# Patient Record
Sex: MALE | Race: WHITE
[De-identification: names, ages, dates, MRNs, and addresses within clinical notes are randomized per-mention and may not be internally consistent; named-entity substitution may affect disease eponyms.]

---

## 2020-12-22 ENCOUNTER — HOSPITAL ENCOUNTER (EMERGENCY)
Dept: HOSPITAL 39 - ER | Age: 55
Discharge: NURSING, REHAB OR LONG-TERM CARE | End: 2020-12-22
Payer: SELF-PAY

## 2020-12-22 VITALS — OXYGEN SATURATION: 95 % | TEMPERATURE: 97 F | DIASTOLIC BLOOD PRESSURE: 84 MMHG | SYSTOLIC BLOOD PRESSURE: 125 MMHG

## 2020-12-22 DIAGNOSIS — Z88.6: ICD-10-CM

## 2020-12-22 DIAGNOSIS — Z88.0: ICD-10-CM

## 2020-12-22 DIAGNOSIS — K59.01: Primary | ICD-10-CM

## 2020-12-22 DIAGNOSIS — E07.9: ICD-10-CM

## 2020-12-22 DIAGNOSIS — K21.9: ICD-10-CM

## 2020-12-22 DIAGNOSIS — I10: ICD-10-CM

## 2020-12-22 DIAGNOSIS — Z79.899: ICD-10-CM

## 2020-12-22 PROCEDURE — 74019 RADEX ABDOMEN 2 VIEWS: CPT

## 2020-12-22 NOTE — ED.PDOC
History of Present Illness





- General


Chief Complaint: Abdominal Pain


Stated Complaint: abd pain, and constipation


Time Seen by Provider: 12/22/20 21:00


Source: patient


Exam Limitations: no limitations





- History of Present Illness


Initial Comments: 





The patient is a 55-year-old  male presented emergency room secondary 

to abdominal pain due to constipation.  The patient has had significant 

constipation episodes since his injury.  The patient has been having cramping 

for the last 24 to 48 hours.  He is passing some watery stool.  Rectal exam here

shows some hard stool in the rectal vault which is removed but still some more 

extensive stool proximal that cannot be reached.  No rebound or peritoneal 

signs.  No blood in the stool.  The only constipation medication I see on the 

patient's medication list is Colace.  No fever.  No vomiting.  Again he is 

having watery output around the stool.


Timing/Duration: 24 hours


Severity: moderate


Improving Factors: nothing


Worsening Factors: nothing


Associated Symptoms: denies symptoms


Allergies/Adverse Reactions: 


Allergies





Aspirin Allergy (Verified 12/22/20 21:34)


   


Penicillins Allergy (Verified 12/22/20 21:34)


   





Home Medications: 


Ambulatory Orders





Azathioprine [Azasan] 75 mg PO DAILY 12/22/20 


Benzocaine-Menthol (Mouth-Thro [Chloraseptic 6-10 mg] 1 ingrid MT Q4HR PRN 12/22/20




Bisacodyl Suppository 10Mg [Dulcolax Suppository 10mg] 10 mg AK DAILY PRN 

12/22/20 


Diphenhydramine-Zinc Acetate [Benadryl 1-0.1 %] 1 cre EX BID 12/22/20 


Docusate Sodium [Colace Cap] 100 mg PO DAILY 12/22/20 


Ibuprofen 400 mg PO Q8HR PRN 12/22/20 


Lactulose 20 gm PO Q6HR PRN #300 ml 12/22/20 


Melatonin 5 mg PO 12/22/20 


Multiple Vitamins W/ Minerals [Multivitamin] 1 tab PO 12/22/20 


Oxybutynin Chloride [Oxybutynin Chloride ER] 5 mg PO 12/22/20 


Promethazine HCl 25 mg PO TID PRN 12/22/20 


Pyridostigmine Bromide 60 mg PO TID PRN 12/22/20 











Review of Systems





- Review of Systems


Constitutional: States: no symptoms reported


EENTM: States: no symptoms reported


Respiratory: States: no symptoms reported


Cardiology: States: no symptoms reported


Gastrointestinal/Abdominal: States: abdominal pain, constipation


Genitourinary: States: no symptoms reported


Musculoskeletal: States: no symptoms reported


Skin: States: no symptoms reported


Neurological: States: anxiety


Endocrine: States: no symptoms reported


All other Systems: No Change from Baseline





Past Medical History (General)





- Patient Medical History


Hx Seizures: No


Hx Stroke: No


Hx Dementia: No


Hx Asthma: No


Hx of COPD: No


Hx Cardiac Disorders: No


Hx Congestive Heart Failure: No


Hx Pacemaker: No


Hx Hypertension: Yes


Hx Thyroid Disease: Yes


Hx Diabetes: No


Hx Gastroesophageal Reflux: Yes


Hx Renal Disease: Yes


Hx Cancer: No


Hx of HIV: No


Hx Hepatitis C: No


Hx MRSA: No





- Vaccination History


Hx Tetanus, Diphtheria Vaccination: Yes


Hx Influenza Vaccination: Yes


Hx Pneumococcal Vaccination: No


Immunizations Up to Date: Yes





- Social History


Hx Tobacco Use: No


Years Tobacco Use: 30


Cigarettes Packs Per Day: 1


Hx Chewing Tobacco Use: No


Hx Alcohol Use: No


Hx Substance Use: No


Hx Substance Use Treatment: No


Hx Depression: No





- Activities of Daily Living


Nursing Home/Assisted Living (if applicable):: [facility] (if applicable):: None





Family Medical History





- Family History


  ** Mother


Family History: Unknown





Physical Exam





- Physical Exam


General Appearance: Alert, Obvious distress


Eye Exam: bilateral normal


Ears, Nose, Throat: hearing grossly normal, normal pharynx


Neck: non-tender


Respiratory: lungs clear, normal breath sounds, no respiratory distress, no 

accessory muscle use


Cardiovascular/Chest: normal peripheral pulses, no edema, other - Borderline 

tachycardia but the patient is obviously uncomfortable


Peripheral Pulses: radial,right: 2+, radial,left: 2+


Gastrointestinal/Abdominal: soft, other - Stool is palpable.  No true rebound or

peritoneal signs.


Rectal Exam: other - Viscous Lidocaine was used for the rectal exam and fecal 

disimpaction.


Extremity: no pedal edema, normal capillary refill


Neurologic: CNs II-XII nml as tested, alert, normal mood/affect - He is 

appropriately anxious, oriented x 3


Skin Exam: normal color


Comments: 





                               Vital Signs - 24 hr











  12/22/20





  20:54


 


Temperature 97.3 F L


 


Pulse Rate [ 112 H





Right Radial] 


 


Respiratory 20





Rate 


 


Blood Pressure 126/101





[Right Arm] 


 


O2 Sat by Pulse 94 L





Oximetry 














Progress





- Progress


Progress: 





12/22/20 22:12


The patient is a 55-year-old  male presented emergency room secondary 

to abdominal pain due to recurrent constipation.  Manual disimpaction was 

attempted by me with viscous lidocaine.  He subsequently had a fleets enema and 

was given oral lactulose.  The patient is going to be written for oral lactulose

to be used up to 3 times daily if needed for constipation.  He should at least 

receive 3 doses over the course of tomorrow in order to help relieve the 

constipation higher up.  The patient does likely need a longer term medication 

to use on a daily or semidaily basis to help prevent constipation in the future 

due to his other medications and decreased mobility.  Periodic doses of Castror 

oil or mineral oil may help.  3-5 times weekly doses of MiraLAX may also help.  

No evidence of any bowel obstruction at this point.  Patient will be discharged 

back to the long-term care facility to resume his rehabilitation.





margret [email or web address] [id number]





- Results/Orders


Results/Orders: 





X-ray of the abdomen shows patient.  He also likely has a stone in the bladder 

and the kidneys.





Departure





- Departure


Clinical Impression: 


 Constipation by delayed colonic transit





Disposition: Discharge to SNF


Condition: Fair


Departure Forms:  ED Discharge - Pt. Copy, Patient Portal Self Enrollment


Instructions:  DI for Abdominal Pain-Adult, Constipation, Adult (DC)


Diet: other - High-fiber diet


Activity: increase activity as tolerated


Referrals: 


Omega Blount MD [Primary Care Provider] - 1-2 Days


Prescriptions: 


Lactulose 20 gm PO Q6HR PRN #300 ml


 PRN Reason: Constipation


Home Medications: 


Ambulatory Orders





Azathioprine [Azasan] 75 mg PO DAILY 12/22/20 


Benzocaine-Menthol (Mouth-Thro [Chloraseptic 6-10 mg] 1 ingrid MT Q4HR PRN 12/22/20




Bisacodyl Suppository 10Mg [Dulcolax Suppository 10mg] 10 mg AK DAILY PRN 

12/22/20 


Diphenhydramine-Zinc Acetate [Benadryl 1-0.1 %] 1 cre EX BID 12/22/20 


Docusate Sodium [Colace Cap] 100 mg PO DAILY 12/22/20 


Ibuprofen 400 mg PO Q8HR PRN 12/22/20 


Lactulose 20 gm PO Q6HR PRN #300 ml 12/22/20 


Melatonin 5 mg PO 12/22/20 


Multiple Vitamins W/ Minerals [Multivitamin] 1 tab PO 12/22/20 


Oxybutynin Chloride [Oxybutynin Chloride ER] 5 mg PO 12/22/20 


Promethazine HCl 25 mg PO TID PRN 12/22/20 


Pyridostigmine Bromide 60 mg PO TID PRN 12/22/20 








Additional Instructions: 


The patient is a 55-year-old  male presented emergency room secondary 

to abdominal pain due to recurrent constipation.  Manual disimpaction was 

attempted by me with viscous lidocaine.  He subsequently had a fleets enema and 

was given oral lactulose.  The patient is going to be written for oral lactulose

to be used up to 3 times daily if needed for constipation.  He should at least 

receive 3 doses over the course of tomorrow in order to help relieve the 

constipation higher up.  The patient does likely need a longer term medication 

to use on a daily or semidaily basis to help prevent constipation in the future 

due to his other medications and decreased mobility.  Periodic doses of Castror 

oil or mineral oil may help.  3-5 times weekly doses of MiraLAX may also help.  

No evidence of any bowel obstruction at this point.  Patient will be discharged 

back to the long-term care facility to resume his rehabilitation.

## 2020-12-22 NOTE — RAD
EXAM DESCRIPTION: Abdomen Flat   Upright 12/22/2020 10:00 PM CST



CLINICAL HISTORY: 55 years, Male, abd pain, possible constipation



COMPARISON: None.



FINDINGS:



2 X-ray views of the of the abdomen (supine and erect) were

performed.  No prior films are available this time for

comparison.  The gas pattern is nondiagnostic.  No signs of ileus

or obstruction. No free air under the diaphragm is noted. Fecal

residue within the rectum could correspond to infection. There is

a radiodensity within the left renal fossa area lower pole

measuring 8.7 mm and a questionable calcification within the

medial aspect of the renal fossa measuring 4.5 mm possibility of

nephrolithiasis could be of consideration.. There is also noted

the presence of a radiodensity within the central aspect of the

pelvis measuring 13.6 mm, whether this correspond to phlebolith

and/or urinary bladder calculus could be of consideration. The

bone windows minimal degenerative changes/anterior spondylosis.  



IMPRESSION:



QUESTIONABLE LEFT NEPHROLITHIASIS AND/OR URINARY BLADDER

CALCULUS.

CONSTIPATION/FECAL IMPACTION.



Electronically signed by:  Rajinder Montgomery MD  12/22/2020 10:02 PM

[facility] Workstation: [id number]

## 2021-01-14 ENCOUNTER — HOSPITAL ENCOUNTER (INPATIENT)
Dept: HOSPITAL 39 - ER | Age: 56
LOS: 5 days | Discharge: NURSING, REHAB OR LONG-TERM CARE | DRG: 522 | End: 2021-01-19
Attending: NURSE PRACTITIONER | Admitting: NURSE PRACTITIONER
Payer: MEDICAID

## 2021-01-14 DIAGNOSIS — G82.20: ICD-10-CM

## 2021-01-14 DIAGNOSIS — I10: ICD-10-CM

## 2021-01-14 DIAGNOSIS — N39.0: ICD-10-CM

## 2021-01-14 DIAGNOSIS — G70.00: ICD-10-CM

## 2021-01-14 DIAGNOSIS — R33.9: ICD-10-CM

## 2021-01-14 DIAGNOSIS — M80.052A: Primary | ICD-10-CM

## 2021-01-14 DIAGNOSIS — K59.09: ICD-10-CM

## 2021-01-14 DIAGNOSIS — F17.210: ICD-10-CM

## 2021-01-14 RX ADMIN — SODIUM CHLORIDE, POTASSIUM CHLORIDE, SODIUM LACTATE AND CALCIUM CHLORIDE PRN MLS/HR: 600; 310; 30; 20 INJECTION, SOLUTION INTRAVENOUS at 17:16

## 2021-01-14 RX ADMIN — Medication SCH: at 21:31

## 2021-01-14 RX ADMIN — HYDROMORPHONE HYDROCHLORIDE PRN MG: 2 INJECTION, SOLUTION INTRAMUSCULAR; INTRAVENOUS; SUBCUTANEOUS at 17:15
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
## 2021-01-14 NOTE — RAD
EXAM DESCRIPTION: 



Chest,1 View



CLINICAL HISTORY: 

z01.818 Preop



COMPARISON: 

None Available.



TECHNIQUE: 

One view radiograph of the chest



FINDINGS: 

Postsurgical changes lower cervical spine.

Cardiac silhouette shows normal heart size.

Pulmonary vascularity is within normal limits.

Lungs show no confluent infiltrates.

No pleural effusion. No pneumothorax.

Osseous structures of chest show no acute displaced fracture.



IMPRESSION: 

No acute cardiopulmonary process.

 



Electronically signed by:  Rajinder Young MD  1/14/2021 4:17 PM [facility]

Workstation: [id number]

## 2021-01-14 NOTE — RAD
EXAM: Hip,Left 2 Views



INDICATION: 55 years Male, fall 



COMPARISON: None available



FINDINGS:



2 views of the left hip were performed. Osteopenia. Acute

subcapital fracture of the proximal left femur with

foreshortening. The femoral head remains well seated within the

acetabulum. No apparent destructive osseous lesion.



IMPRESSION:



Acute subcapital fracture of the left femur.



Electronically signed by:  Verenice Hernandez MD  1/14/2021 12:49 PM

[facility] Workstation: [id number]
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
## 2021-01-15 PROCEDURE — 0SRS0J9 REPLACEMENT OF LEFT HIP JOINT, FEMORAL SURFACE WITH SYNTHETIC SUBSTITUTE, CEMENTED, OPEN APPROACH: ICD-10-PCS | Performed by: ORTHOPAEDIC SURGERY

## 2021-01-15 RX ADMIN — HYDROMORPHONE HYDROCHLORIDE PRN MG: 2 INJECTION, SOLUTION INTRAMUSCULAR; INTRAVENOUS; SUBCUTANEOUS at 00:47

## 2021-01-15 RX ADMIN — PANTOPRAZOLE SODIUM SCH MG: 40 INJECTION, POWDER, FOR SOLUTION INTRAVENOUS at 05:48

## 2021-01-15 RX ADMIN — Medication SCH ML: at 20:41

## 2021-01-15 RX ADMIN — HYDROMORPHONE HYDROCHLORIDE PRN MG: 2 INJECTION, SOLUTION INTRAMUSCULAR; INTRAVENOUS; SUBCUTANEOUS at 08:24

## 2021-01-15 RX ADMIN — CEFTRIAXONE SCH MLS/HR: 1 INJECTION, POWDER, FOR SOLUTION INTRAMUSCULAR; INTRAVENOUS at 15:53

## 2021-01-15 RX ADMIN — DOCUSATE CALCIUM SCH: 240 CAPSULE, LIQUID FILLED ORAL at 20:24

## 2021-01-15 RX ADMIN — HYDROMORPHONE HYDROCHLORIDE PRN MG: 2 INJECTION, SOLUTION INTRAMUSCULAR; INTRAVENOUS; SUBCUTANEOUS at 21:06

## 2021-01-15 RX ADMIN — DOCUSATE SODIUM SCH: 100 CAPSULE, LIQUID FILLED ORAL at 09:19

## 2021-01-15 RX ADMIN — SODIUM CHLORIDE, POTASSIUM CHLORIDE, SODIUM LACTATE AND CALCIUM CHLORIDE PRN MLS/HR: 600; 310; 30; 20 INJECTION, SOLUTION INTRAVENOUS at 00:54

## 2021-01-15 RX ADMIN — Medication SCH ML: at 09:18

## 2021-01-15 RX ADMIN — ENOXAPARIN SODIUM SCH: 30 INJECTION, SOLUTION INTRAVENOUS; SUBCUTANEOUS at 23:18

## 2021-01-15 RX ADMIN — HYDROMORPHONE HYDROCHLORIDE PRN MG: 2 INJECTION, SOLUTION INTRAMUSCULAR; INTRAVENOUS; SUBCUTANEOUS at 04:20

## 2021-01-15 RX ADMIN — ENOXAPARIN SODIUM SCH MG: 30 INJECTION, SOLUTION INTRAVENOUS; SUBCUTANEOUS at 22:56

## 2021-01-15 RX ADMIN — VANCOMYCIN HYDROCHLORIDE SCH MLS/HR: 500 INJECTION, POWDER, LYOPHILIZED, FOR SOLUTION INTRAVENOUS at 20:23

## 2021-01-15 NOTE — RAD
EXAM DESCRIPTION: 



Hip,Left 2 Views



CLINICAL HISTORY: 



post-op



COMPARISON: 



January 14, 2021



IMPRESSION: 



2 views of the left hip including crosstable lateral shows

interval postsurgical changes from hip arthroplasty with cement

fixation of the femoral component. No complicating features are

seen. Soft tissue emphysema lateral to the hip is identified.

Osseous structures are diffusely osteopenic.



Electronically signed by:  Errol Ramirez MD  1/15/2021 3:51 PM [facility] [[email or web address]]

Workstation: [id number]

## 2021-01-15 NOTE — RAD
EXAM DESCRIPTION: Pelvis,2 or More Views



CLINICAL HISTORY: 55 years Male, post op



COMPARISON: Yesterday



Findings: One view(s)/radiograph(s)



Left hip arthroplasty. No hardware complication. Osteopenia.

Suboptimal profiling of the right femoral neck. No acute fracture

lucency is identified. No dislocation.



IMPRESSION:

Left hip arthroplasty. No hardware complication.



Electronically signed by:  Sameer Madrid MD  1/15/2021 3:52 PM

[facility] Workstation: [id number]
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
## 2021-01-16 RX ADMIN — ENOXAPARIN SODIUM SCH MG: 30 INJECTION, SOLUTION INTRAVENOUS; SUBCUTANEOUS at 23:45

## 2021-01-16 RX ADMIN — ENOXAPARIN SODIUM SCH: 30 INJECTION, SOLUTION INTRAVENOUS; SUBCUTANEOUS at 11:01

## 2021-01-16 RX ADMIN — DOCUSATE SODIUM SCH MG: 100 CAPSULE, LIQUID FILLED ORAL at 08:57

## 2021-01-16 RX ADMIN — HYDROMORPHONE HYDROCHLORIDE PRN MG: 2 INJECTION, SOLUTION INTRAMUSCULAR; INTRAVENOUS; SUBCUTANEOUS at 07:32

## 2021-01-16 RX ADMIN — Medication SCH MG: at 09:03

## 2021-01-16 RX ADMIN — HYDROMORPHONE HYDROCHLORIDE PRN MG: 2 INJECTION, SOLUTION INTRAMUSCULAR; INTRAVENOUS; SUBCUTANEOUS at 11:17

## 2021-01-16 RX ADMIN — HYDROMORPHONE HYDROCHLORIDE PRN MG: 2 INJECTION, SOLUTION INTRAMUSCULAR; INTRAVENOUS; SUBCUTANEOUS at 19:01

## 2021-01-16 RX ADMIN — VANCOMYCIN HYDROCHLORIDE SCH MLS/HR: 500 INJECTION, POWDER, LYOPHILIZED, FOR SOLUTION INTRAVENOUS at 08:56

## 2021-01-16 RX ADMIN — HYDROMORPHONE HYDROCHLORIDE PRN MG: 2 INJECTION, SOLUTION INTRAMUSCULAR; INTRAVENOUS; SUBCUTANEOUS at 15:00

## 2021-01-16 RX ADMIN — DOCUSATE CALCIUM SCH MG: 240 CAPSULE, LIQUID FILLED ORAL at 20:47

## 2021-01-16 RX ADMIN — Medication SCH ML: at 20:48

## 2021-01-16 RX ADMIN — HYDROMORPHONE HYDROCHLORIDE PRN MG: 2 INJECTION, SOLUTION INTRAMUSCULAR; INTRAVENOUS; SUBCUTANEOUS at 03:38

## 2021-01-16 RX ADMIN — Medication SCH ML: at 08:58

## 2021-01-16 RX ADMIN — PANTOPRAZOLE SODIUM SCH MG: 40 INJECTION, POWDER, FOR SOLUTION INTRAVENOUS at 05:39

## 2021-01-16 RX ADMIN — CEFTRIAXONE SCH MLS/HR: 1 INJECTION, POWDER, FOR SOLUTION INTRAMUSCULAR; INTRAVENOUS at 14:39
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
## 2021-01-17 RX ADMIN — HYDROMORPHONE HYDROCHLORIDE PRN MG: 2 INJECTION, SOLUTION INTRAMUSCULAR; INTRAVENOUS; SUBCUTANEOUS at 08:07

## 2021-01-17 RX ADMIN — Medication SCH ML: at 12:05

## 2021-01-17 RX ADMIN — DOCUSATE SODIUM SCH MG: 100 CAPSULE, LIQUID FILLED ORAL at 08:08

## 2021-01-17 RX ADMIN — VANCOMYCIN HYDROCHLORIDE SCH MLS/HR: 500 INJECTION, POWDER, LYOPHILIZED, FOR SOLUTION INTRAVENOUS at 20:29

## 2021-01-17 RX ADMIN — HYDROMORPHONE HYDROCHLORIDE PRN MG: 2 INJECTION, SOLUTION INTRAMUSCULAR; INTRAVENOUS; SUBCUTANEOUS at 16:05

## 2021-01-17 RX ADMIN — DOCUSATE CALCIUM SCH MG: 240 CAPSULE, LIQUID FILLED ORAL at 20:08

## 2021-01-17 RX ADMIN — Medication SCH ML: at 08:08

## 2021-01-17 RX ADMIN — Medication SCH ML: at 20:07

## 2021-01-17 RX ADMIN — HYDROMORPHONE HYDROCHLORIDE PRN MG: 2 INJECTION, SOLUTION INTRAMUSCULAR; INTRAVENOUS; SUBCUTANEOUS at 12:00

## 2021-01-17 RX ADMIN — ENOXAPARIN SODIUM SCH MG: 30 INJECTION, SOLUTION INTRAVENOUS; SUBCUTANEOUS at 22:53

## 2021-01-17 RX ADMIN — PANTOPRAZOLE SODIUM SCH MG: 40 INJECTION, POWDER, FOR SOLUTION INTRAVENOUS at 06:06

## 2021-01-17 RX ADMIN — CEFTRIAXONE SCH MLS/HR: 1 INJECTION, POWDER, FOR SOLUTION INTRAMUSCULAR; INTRAVENOUS at 13:53

## 2021-01-17 RX ADMIN — ENOXAPARIN SODIUM SCH: 30 INJECTION, SOLUTION INTRAVENOUS; SUBCUTANEOUS at 12:05

## 2021-01-17 RX ADMIN — HYDROMORPHONE HYDROCHLORIDE PRN MG: 2 INJECTION, SOLUTION INTRAMUSCULAR; INTRAVENOUS; SUBCUTANEOUS at 20:05

## 2021-01-17 RX ADMIN — Medication SCH ML: at 20:08

## 2021-01-17 RX ADMIN — Medication SCH MG: at 08:08

## 2021-01-17 NOTE — PN
DATE:  1/17/21



SUBJECTIVE:  Mr. Henry is doing well.  He has adequate pain control right now.



OBJECTIVE:  He is afebrile.  Vital signs are stable.  Wound is clean.  There are
no signs or symptoms of infection.



ASSESSMENT: 

1.   Status post hemiarthroplasty.



PLAN:  He will continue on with his weightbearing status.



#[id number]

MTDD

## 2021-01-17 NOTE — PN
DATE:  1/16/21



SUBJECTIVE:  Mr. Henry is doing well.  He has good pain control.



OBJECTIVE:  Vital signs are stable.  Dressing is clean, dry and intact.



ASSESSMENT: 

1.   Status post hemiarthroplasty.



PLAN:  The plan at this point is for him to begin weightbearing as tolerated.



#[id number]

[facility]

## 2021-01-18 RX ADMIN — Medication SCH ML: at 20:38

## 2021-01-18 RX ADMIN — PANTOPRAZOLE SODIUM SCH MG: 40 INJECTION, POWDER, FOR SOLUTION INTRAVENOUS at 06:07

## 2021-01-18 RX ADMIN — VANCOMYCIN HYDROCHLORIDE SCH MLS/HR: 500 INJECTION, POWDER, LYOPHILIZED, FOR SOLUTION INTRAVENOUS at 20:50

## 2021-01-18 RX ADMIN — HYDROMORPHONE HYDROCHLORIDE PRN MG: 2 INJECTION, SOLUTION INTRAMUSCULAR; INTRAVENOUS; SUBCUTANEOUS at 04:00

## 2021-01-18 RX ADMIN — DOCUSATE SODIUM SCH MG: 100 CAPSULE, LIQUID FILLED ORAL at 08:21

## 2021-01-18 RX ADMIN — NICOTINE SCH EA: 14 PATCH, EXTENDED RELEASE TRANSDERMAL at 10:03

## 2021-01-18 RX ADMIN — DOCUSATE CALCIUM SCH MG: 240 CAPSULE, LIQUID FILLED ORAL at 20:45

## 2021-01-18 RX ADMIN — Medication SCH: at 08:25

## 2021-01-18 RX ADMIN — Medication SCH MG: at 08:21

## 2021-01-18 RX ADMIN — VANCOMYCIN HYDROCHLORIDE SCH MLS/HR: 500 INJECTION, POWDER, LYOPHILIZED, FOR SOLUTION INTRAVENOUS at 09:44

## 2021-01-18 RX ADMIN — ENOXAPARIN SODIUM SCH MG: 30 INJECTION, SOLUTION INTRAVENOUS; SUBCUTANEOUS at 10:03

## 2021-01-18 RX ADMIN — HYDROMORPHONE HYDROCHLORIDE PRN MG: 2 INJECTION, SOLUTION INTRAMUSCULAR; INTRAVENOUS; SUBCUTANEOUS at 08:20

## 2021-01-18 RX ADMIN — CEFTRIAXONE SCH MLS/HR: 1 INJECTION, POWDER, FOR SOLUTION INTRAMUSCULAR; INTRAVENOUS at 15:03

## 2021-01-18 RX ADMIN — ENOXAPARIN SODIUM SCH MG: 30 INJECTION, SOLUTION INTRAVENOUS; SUBCUTANEOUS at 22:52

## 2021-01-18 RX ADMIN — HYDROCODONE BITARTRATE AND ACETAMINOPHEN PRN EA: 10; 325 TABLET ORAL at 11:56

## 2021-01-18 RX ADMIN — Medication SCH ML: at 08:25
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
## 2021-01-19 VITALS — OXYGEN SATURATION: 97 % | DIASTOLIC BLOOD PRESSURE: 76 MMHG | TEMPERATURE: 98 F | SYSTOLIC BLOOD PRESSURE: 121 MMHG

## 2021-01-19 RX ADMIN — HYDROCODONE BITARTRATE AND ACETAMINOPHEN PRN EA: 10; 325 TABLET ORAL at 09:13

## 2021-01-19 RX ADMIN — Medication SCH: at 08:03

## 2021-01-19 RX ADMIN — Medication SCH ML: at 08:03

## 2021-01-19 RX ADMIN — ENOXAPARIN SODIUM SCH MG: 30 INJECTION, SOLUTION INTRAVENOUS; SUBCUTANEOUS at 10:05

## 2021-01-19 RX ADMIN — PANTOPRAZOLE SODIUM SCH MG: 40 INJECTION, POWDER, FOR SOLUTION INTRAVENOUS at 06:21

## 2021-01-19 RX ADMIN — HYDROCODONE BITARTRATE AND ACETAMINOPHEN PRN EA: 10; 325 TABLET ORAL at 05:07

## 2021-01-19 RX ADMIN — VANCOMYCIN HYDROCHLORIDE SCH MLS/HR: 500 INJECTION, POWDER, LYOPHILIZED, FOR SOLUTION INTRAVENOUS at 08:01

## 2021-01-19 RX ADMIN — NICOTINE SCH EA: 14 PATCH, EXTENDED RELEASE TRANSDERMAL at 08:01

## 2021-01-19 RX ADMIN — DOCUSATE SODIUM SCH MG: 100 CAPSULE, LIQUID FILLED ORAL at 08:01

## 2021-01-19 RX ADMIN — Medication SCH MG: at 08:01

## 2021-01-20 NOTE — OP
DATE OF PROCEDURE:  01/15/21



PREOPERATIVE DIAGNOSIS: 

1.  Femoral neck fracture.



POSTOPERATIVE DIAGNOSIS: 

1.  Femoral neck fracture.



PROCEDURE: 

1.  Hemiarthroplasty.



SURGEON:  Hernandez Mora MD.



ASSISTANT:  Olayinka Zhang CST, SA-C.



ANESTHESIA: General.



COMPLICATIONS:  None.



FINDINGS: Transcervical femoral neck fracture with severely osteoporotic bone.



INDICATION:  Mr. Henry has a history of a fall and had the acute onset of pain.  
His index fall was actually about two weeks prior to his presentation to the 
hospital.  Subsequent to that, he had x-rays and x-rays revealed a fracture of 
the femoral neck.  After discussing the risks, benefits and alternatives to 
surgical intervention, he gave informed consent for hemiarthroplasty.



PROCEDURE: The patient was brought to the Operating Room and placed in supine 
position.  Anesthesia was induced and the patient was transitioned into the 
lateral decubitus position.  The leg and hemipelvis were sterilely prepped and 
draped and an incision was made centered on the greater trochanter with 
extension both proximally and distally.  Dissection was carried down to the 
iliotibial band which was sharply incised along the course of its fibers.  A 
Charnley retractor was placed and the abductor musculature was identified.  The 
anterior one-third of the abductor musculature was elevated off the greater 
trochanter using electrocautery and the capsule was incised.  The femoral head 
was removed and the primary femoral neck cut was made.  The acetabulum was 
examined and found to be free of any significant defect, therefore attention was
focused on the femur.  The femoral canal was sequentially broached until an 
appropriate sized trial prosthesis was placed.  A trial femoral head was placed 
and the hip was reduced.  The hip was taken through a full range of motion and 
demonstrated stability without impingement or pending dislocation and the leg 
length appeared to be paresthesias.  Following trialing, the trial component was
removed and the femoral canal was prepared for cementation of the prosthesis.  A
distal cement restrictor was placed and the final component was cemented into 
place.  The excess cement was removed and the remaining cement was allowed to 
cure.  The final head was impacted and the hip was reduced, taken through a full
range of motion, and found to be stable without impingement.  The wound was 
thoroughly irrigated and the abductor musculature was reapproximated to the 
greater trochanter through drill holes using Ethibond.  The repair was augmented
with PDS suture and the iliotibial band was subsequently closed.  The 
subcutaneous tissues were closed with a combination of running and interrupted 
subcuticular stitches, a sterile dressing was placed, and the patient was 
transitioned into the supine position.  The patient was awoken from anesthesia 
and taken to the Recovery Room in stable condition.



POSTOPERATIVE PLAN: The patient will be weight-bearing as tolerated on 
postoperative day 1.  



COMPONENTS:  Hunter Accolade II, size 6 stem and size 54 mm head.



#[id number]

MTDD

## 2021-01-20 NOTE — PN
DATE:  01/19/21



SUBJECTIVE:  Mr. Henry is doing well and his pain is improved.



OBJECTIVE:  Afebrile.  Vital signs stable.  Wound is clean.  There are no signs 
or symptoms of infection.



ASSESSMENT:  Status post hemiarthroplasty.



PLAN:  The plan today is to have Mr. Henry return to [facility] for his 
inpatient rehab.  He will continue his weightbearing status and will followup 
with us in two weeks.



#[id number]

MTDD
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
